# Patient Record
Sex: MALE | Race: WHITE | NOT HISPANIC OR LATINO | URBAN - METROPOLITAN AREA
[De-identification: names, ages, dates, MRNs, and addresses within clinical notes are randomized per-mention and may not be internally consistent; named-entity substitution may affect disease eponyms.]

---

## 2017-03-03 ENCOUNTER — APPOINTMENT (RX ONLY)
Dept: URBAN - METROPOLITAN AREA CLINIC 43 | Facility: CLINIC | Age: 62
Setting detail: DERMATOLOGY
End: 2017-03-03

## 2017-03-03 DIAGNOSIS — L64.8 OTHER ANDROGENIC ALOPECIA: ICD-10-CM | Status: STABLE

## 2017-03-03 PROCEDURE — ? OTHER

## 2017-03-03 PROCEDURE — ? PRESCRIPTION

## 2017-03-03 RX ORDER — FINASTERIDE 5 MG/1
TABLET, FILM COATED ORAL
Qty: 30 | Refills: 3 | Status: ERX

## 2017-03-03 NOTE — PROCEDURE: OTHER
Detail Level: Detailed
Other (Free Text): Will call in one year refill to [facility] for Minoxidil without propylene glycol.
Note Text (......Xxx Chief Complaint.): This diagnosis correlates with the

## 2018-02-23 ENCOUNTER — APPOINTMENT (RX ONLY)
Dept: URBAN - METROPOLITAN AREA CLINIC 43 | Facility: CLINIC | Age: 63
Setting detail: DERMATOLOGY
End: 2018-02-23

## 2018-02-23 DIAGNOSIS — L64.8 OTHER ANDROGENIC ALOPECIA: ICD-10-CM | Status: STABLE

## 2018-02-23 PROCEDURE — ? PRESCRIPTION

## 2018-02-23 RX ORDER — FINASTERIDE 1 MG/1
TABLET, FILM COATED ORAL
Qty: 180 | Refills: 1 | Status: ERX | COMMUNITY
Start: 2018-02-23

## 2018-02-23 RX ADMIN — FINASTERIDE: 1 TABLET, FILM COATED ORAL at 00:00

## 2019-05-09 ENCOUNTER — APPOINTMENT (RX ONLY)
Dept: URBAN - METROPOLITAN AREA CLINIC 43 | Facility: CLINIC | Age: 64
Setting detail: DERMATOLOGY
End: 2019-05-09

## 2019-05-09 DIAGNOSIS — L64.8 OTHER ANDROGENIC ALOPECIA: ICD-10-CM | Status: STABLE

## 2019-05-09 PROCEDURE — ? PRESCRIPTION

## 2019-05-09 PROCEDURE — ? TREATMENT REGIMEN

## 2019-05-09 RX ORDER — FINASTERIDE 1 MG/1
TABLET, FILM COATED ORAL
Qty: 180 | Refills: 1 | Status: ERX

## 2019-05-09 NOTE — PROCEDURE: TREATMENT REGIMEN
Detail Level: Simple
Continue Regimen: Minoxidil 5% solution without propylene glycol from [facility].

## 2020-05-18 ENCOUNTER — APPOINTMENT (RX ONLY)
Dept: URBAN - METROPOLITAN AREA CLINIC 43 | Facility: CLINIC | Age: 65
Setting detail: DERMATOLOGY
End: 2020-05-18

## 2020-05-18 DIAGNOSIS — L64.8 OTHER ANDROGENIC ALOPECIA: ICD-10-CM | Status: WELL CONTROLLED

## 2020-05-18 PROCEDURE — ? TREATMENT REGIMEN

## 2020-05-18 PROCEDURE — ? PRESCRIPTION

## 2020-05-18 NOTE — HPI: MEDICATION
What Is The Medication?: Finasteride 1 mg\\n\\nRogaine
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

## 2020-05-18 NOTE — PROCEDURE: TREATMENT REGIMEN
Continue Regimen: Minoxidil 5% without propylene glycol, will call in refill to [facility].
Detail Level: Detailed

## 2021-03-17 ENCOUNTER — APPOINTMENT (RX ONLY)
Dept: URBAN - METROPOLITAN AREA CLINIC 43 | Facility: CLINIC | Age: 66
Setting detail: DERMATOLOGY
End: 2021-03-17

## 2021-03-17 DIAGNOSIS — L64.8 OTHER ANDROGENIC ALOPECIA: ICD-10-CM | Status: STABLE

## 2021-03-17 PROCEDURE — ? TREATMENT REGIMEN

## 2021-03-17 PROCEDURE — ? PRESCRIPTION

## 2021-03-17 PROCEDURE — 99213 OFFICE O/P EST LOW 20 MIN: CPT | Mod: 95

## 2021-03-17 PROCEDURE — ? OTHER

## 2021-03-17 RX ORDER — FINASTERIDE 1 MG/1
TABLET, FILM COATED ORAL
Qty: 365 | Refills: 0 | Status: ERX

## 2021-03-17 ASSESSMENT — LOCATION DETAILED DESCRIPTION DERM
LOCATION DETAILED: POSTERIOR MID-PARIETAL SCALP
LOCATION DETAILED: LEFT SUPERIOR PARIETAL SCALP

## 2021-03-17 ASSESSMENT — LOCATION SIMPLE DESCRIPTION DERM
LOCATION SIMPLE: SCALP
LOCATION SIMPLE: POSTERIOR SCALP

## 2021-03-17 ASSESSMENT — LOCATION ZONE DERM: LOCATION ZONE: SCALP

## 2021-03-17 NOTE — PROCEDURE: OTHER
Note Text (......Xxx Chief Complaint.): This diagnosis correlates with the
Detail Level: Detailed
Other (Free Text): Ok to renew minoxidil at [facility].
Render Risk Assessment In Note?: yes

## 2021-03-17 NOTE — PROCEDURE: TREATMENT REGIMEN
Detail Level: Simple
Continue Regimen: Finasteride 1 mg once daily.  \\nMinoxidil solution twice daily.

## 2022-03-08 ENCOUNTER — APPOINTMENT (RX ONLY)
Dept: URBAN - METROPOLITAN AREA CLINIC 43 | Facility: CLINIC | Age: 67
Setting detail: DERMATOLOGY
End: 2022-03-08

## 2022-03-08 DIAGNOSIS — L64.8 OTHER ANDROGENIC ALOPECIA: ICD-10-CM

## 2022-03-08 PROCEDURE — ? TREATMENT REGIMEN

## 2022-03-08 PROCEDURE — 99213 OFFICE O/P EST LOW 20 MIN: CPT

## 2022-03-08 PROCEDURE — ? PRESCRIPTION

## 2022-03-08 PROCEDURE — ? OTHER

## 2022-03-08 RX ORDER — FINASTERIDE 1 MG/1
TABLET, FILM COATED ORAL
Qty: 365 | Refills: 0 | Status: ERX | COMMUNITY
Start: 2022-03-08

## 2022-03-08 RX ADMIN — FINASTERIDE: 1 TABLET, FILM COATED ORAL at 00:00

## 2022-03-08 ASSESSMENT — LOCATION DETAILED DESCRIPTION DERM
LOCATION DETAILED: LEFT SUPERIOR PARIETAL SCALP
LOCATION DETAILED: POSTERIOR MID-PARIETAL SCALP

## 2022-03-08 ASSESSMENT — LOCATION SIMPLE DESCRIPTION DERM
LOCATION SIMPLE: SCALP
LOCATION SIMPLE: POSTERIOR SCALP

## 2022-03-08 ASSESSMENT — LOCATION ZONE DERM: LOCATION ZONE: SCALP

## 2022-03-08 NOTE — PROCEDURE: OTHER
Note Text (......Xxx Chief Complaint.): This diagnosis correlates with the
Detail Level: Detailed
Other (Free Text): Ok to renew minoxidil at [facility].
Render Risk Assessment In Note?: yes

## 2023-02-10 ENCOUNTER — RX ONLY (OUTPATIENT)
Age: 68
Setting detail: RX ONLY
End: 2023-02-10

## 2023-02-10 RX ORDER — FINASTERIDE 1 MG/1
TABLET, FILM COATED ORAL
Qty: 60 | Refills: 0 | Status: ERX

## 2023-06-07 ENCOUNTER — APPOINTMENT (RX ONLY)
Dept: URBAN - METROPOLITAN AREA CLINIC 43 | Facility: CLINIC | Age: 68
Setting detail: DERMATOLOGY
End: 2023-06-07

## 2023-06-07 DIAGNOSIS — L64.8 OTHER ANDROGENIC ALOPECIA: ICD-10-CM

## 2023-06-07 PROCEDURE — ? OTHER

## 2023-06-07 PROCEDURE — ? COUNSELING

## 2023-06-07 PROCEDURE — ? PRESCRIPTION

## 2023-06-07 RX ORDER — FINASTERIDE 1 MG/1
TABLET, FILM COATED ORAL
Qty: 365 | Refills: 0 | Status: ERX

## 2023-06-07 ASSESSMENT — LOCATION DETAILED DESCRIPTION DERM: LOCATION DETAILED: LEFT SUPERIOR PARIETAL SCALP

## 2023-06-07 ASSESSMENT — LOCATION SIMPLE DESCRIPTION DERM: LOCATION SIMPLE: SCALP

## 2023-06-07 ASSESSMENT — LOCATION ZONE DERM: LOCATION ZONE: SCALP

## 2023-06-07 NOTE — PROCEDURE: OTHER
Note Text (......Xxx Chief Complaint.): This diagnosis correlates with the
Detail Level: Detailed
Other (Free Text): Using OTC minoxidil 5% without PG due to allergy
Render Risk Assessment In Note?: yes

## 2024-05-17 ENCOUNTER — APPOINTMENT (RX ONLY)
Dept: URBAN - METROPOLITAN AREA CLINIC 43 | Facility: CLINIC | Age: 69
Setting detail: DERMATOLOGY
End: 2024-05-17

## 2024-05-17 DIAGNOSIS — L64.8 OTHER ANDROGENIC ALOPECIA: ICD-10-CM | Status: WELL CONTROLLED

## 2024-05-17 PROCEDURE — 99213 OFFICE O/P EST LOW 20 MIN: CPT

## 2024-05-17 PROCEDURE — ? COUNSELING

## 2024-05-17 PROCEDURE — ? PRESCRIPTION MEDICATION MANAGEMENT

## 2024-05-17 PROCEDURE — ? PRESCRIPTION

## 2024-05-17 PROCEDURE — ? OTHER

## 2024-05-17 RX ORDER — FINASTERIDE 1 MG/1
TABLET, FILM COATED ORAL
Qty: 365 | Refills: 0 | Status: ERX

## 2024-05-17 RX ORDER — MINOXIDIL 2.5 MG/1
TABLET ORAL
Qty: 90 | Refills: 3 | Status: ERX | COMMUNITY
Start: 2024-05-17

## 2024-05-17 RX ORDER — FINASTERIDE 1 MG/1
TABLET, FILM COATED ORAL
Qty: 365 | Refills: 0 | Status: CANCELLED
Stop reason: CLARIF

## 2024-05-17 RX ADMIN — MINOXIDIL: 2.5 TABLET ORAL at 00:00

## 2024-05-17 ASSESSMENT — LOCATION DETAILED DESCRIPTION DERM: LOCATION DETAILED: LEFT SUPERIOR PARIETAL SCALP

## 2024-05-17 ASSESSMENT — LOCATION SIMPLE DESCRIPTION DERM: LOCATION SIMPLE: SCALP

## 2024-05-17 ASSESSMENT — LOCATION ZONE DERM: LOCATION ZONE: SCALP

## 2024-05-17 NOTE — PROCEDURE: PRESCRIPTION MEDICATION MANAGEMENT
Detail Level: Zone
Plan: Continue: \\nfinasteride 1 mg tablet - Take 1 po qd\\n\\nminoxidil 2.5 mg tablet -Take 1/2 tablet daily x 1 month, then increase to 1 tablet daily if tolerating well.
Render In Strict Bullet Format?: No

## 2024-05-17 NOTE — PROCEDURE: COUNSELING
Patient Specific Counseling (Will Not Stick From Patient To Patient): He would like to try minoxidil in addition to finasteride at this time
Detail Level: Zone

## 2025-05-13 ENCOUNTER — APPOINTMENT (OUTPATIENT)
Dept: URBAN - METROPOLITAN AREA CLINIC 43 | Facility: CLINIC | Age: 70
Setting detail: DERMATOLOGY
End: 2025-05-13

## 2025-05-13 DIAGNOSIS — L64.8 OTHER ANDROGENIC ALOPECIA: ICD-10-CM

## 2025-05-13 PROCEDURE — ? COUNSELING

## 2025-05-13 PROCEDURE — ? PRESCRIPTION

## 2025-05-13 PROCEDURE — 99213 OFFICE O/P EST LOW 20 MIN: CPT

## 2025-05-13 RX ORDER — FINASTERIDE 1 MG/1
TABLET, FILM COATED ORAL
Qty: 365 | Refills: 0 | Status: ERX

## 2025-05-13 RX ORDER — MINOXIDIL 2.5 MG/1
TABLET ORAL
Qty: 90 | Refills: 3 | Status: ERX